# Patient Record
Sex: MALE | Race: WHITE | Employment: UNEMPLOYED | URBAN - METROPOLITAN AREA
[De-identification: names, ages, dates, MRNs, and addresses within clinical notes are randomized per-mention and may not be internally consistent; named-entity substitution may affect disease eponyms.]

---

## 2022-11-24 ENCOUNTER — APPOINTMENT (OUTPATIENT)
Dept: GENERAL RADIOLOGY | Age: 57
DRG: 918 | End: 2022-11-24
Payer: MEDICAID

## 2022-11-24 ENCOUNTER — HOSPITAL ENCOUNTER (INPATIENT)
Age: 57
LOS: 1 days | Discharge: HOME | DRG: 918 | End: 2022-11-25
Attending: EMERGENCY MEDICINE | Admitting: INTERNAL MEDICINE
Payer: MEDICAID

## 2022-11-24 ENCOUNTER — APPOINTMENT (OUTPATIENT)
Dept: CT IMAGING | Age: 57
DRG: 918 | End: 2022-11-24
Payer: MEDICAID

## 2022-11-24 DIAGNOSIS — R51.9 ACUTE NONINTRACTABLE HEADACHE, UNSPECIFIED HEADACHE TYPE: ICD-10-CM

## 2022-11-24 DIAGNOSIS — R09.02 HYPOXIA: ICD-10-CM

## 2022-11-24 DIAGNOSIS — T68.XXXA HYPOTHERMIA, INITIAL ENCOUNTER: ICD-10-CM

## 2022-11-24 DIAGNOSIS — T50.901A ACCIDENTAL DRUG OVERDOSE, INITIAL ENCOUNTER: Primary | ICD-10-CM

## 2022-11-24 DIAGNOSIS — R61 DIAPHORESIS: ICD-10-CM

## 2022-11-24 LAB
A/G RATIO: 1.3 (ref 1.1–2.2)
ACETAMINOPHEN LEVEL: <5 UG/ML (ref 10–30)
ALBUMIN SERPL-MCNC: 4.4 G/DL (ref 3.4–5)
ALP BLD-CCNC: 59 U/L (ref 40–129)
ALT SERPL-CCNC: 32 U/L (ref 10–40)
ANION GAP SERPL CALCULATED.3IONS-SCNC: 15 MMOL/L (ref 3–16)
AST SERPL-CCNC: 32 U/L (ref 15–37)
BASOPHILS ABSOLUTE: 0.2 K/UL (ref 0–0.2)
BASOPHILS RELATIVE PERCENT: 3.1 %
BILIRUB SERPL-MCNC: <0.2 MG/DL (ref 0–1)
BUN BLDV-MCNC: 20 MG/DL (ref 7–20)
CALCIUM SERPL-MCNC: 9.4 MG/DL (ref 8.3–10.6)
CHLORIDE BLD-SCNC: 103 MMOL/L (ref 99–110)
CO2: 23 MMOL/L (ref 21–32)
CREAT SERPL-MCNC: 1 MG/DL (ref 0.9–1.3)
EKG ATRIAL RATE: 80 BPM
EKG DIAGNOSIS: NORMAL
EKG P AXIS: 53 DEGREES
EKG P-R INTERVAL: 188 MS
EKG Q-T INTERVAL: 406 MS
EKG QRS DURATION: 84 MS
EKG QTC CALCULATION (BAZETT): 468 MS
EKG R AXIS: -11 DEGREES
EKG T AXIS: 60 DEGREES
EKG VENTRICULAR RATE: 80 BPM
EOSINOPHILS ABSOLUTE: 0.1 K/UL (ref 0–0.6)
EOSINOPHILS RELATIVE PERCENT: 0.8 %
ETHANOL: NORMAL MG/DL (ref 0–0.08)
GFR SERPL CREATININE-BSD FRML MDRD: >60 ML/MIN/{1.73_M2}
GLUCOSE BLD-MCNC: 150 MG/DL (ref 70–99)
HCT VFR BLD CALC: 44.6 % (ref 40.5–52.5)
HEMOGLOBIN: 15.2 G/DL (ref 13.5–17.5)
LYMPHOCYTES ABSOLUTE: 1.8 K/UL (ref 1–5.1)
LYMPHOCYTES RELATIVE PERCENT: 28 %
MAGNESIUM: 2.4 MG/DL (ref 1.8–2.4)
MCH RBC QN AUTO: 29.3 PG (ref 26–34)
MCHC RBC AUTO-ENTMCNC: 34 G/DL (ref 31–36)
MCV RBC AUTO: 86 FL (ref 80–100)
MONOCYTES ABSOLUTE: 0.5 K/UL (ref 0–1.3)
MONOCYTES RELATIVE PERCENT: 7 %
NEUTROPHILS ABSOLUTE: 4 K/UL (ref 1.7–7.7)
NEUTROPHILS RELATIVE PERCENT: 61.1 %
PDW BLD-RTO: 13.8 % (ref 12.4–15.4)
PLATELET # BLD: 392 K/UL (ref 135–450)
PMV BLD AUTO: 8.8 FL (ref 5–10.5)
POTASSIUM SERPL-SCNC: 3.9 MMOL/L (ref 3.5–5.1)
RBC # BLD: 5.19 M/UL (ref 4.2–5.9)
SALICYLATE, SERUM: <0.3 MG/DL (ref 15–30)
SODIUM BLD-SCNC: 141 MMOL/L (ref 136–145)
TOTAL PROTEIN: 7.8 G/DL (ref 6.4–8.2)
TROPONIN: <0.01 NG/ML
TROPONIN: <0.01 NG/ML
WBC # BLD: 6.5 K/UL (ref 4–11)

## 2022-11-24 PROCEDURE — 82077 ASSAY SPEC XCP UR&BREATH IA: CPT

## 2022-11-24 PROCEDURE — 80053 COMPREHEN METABOLIC PANEL: CPT

## 2022-11-24 PROCEDURE — 84484 ASSAY OF TROPONIN QUANT: CPT

## 2022-11-24 PROCEDURE — 70450 CT HEAD/BRAIN W/O DYE: CPT

## 2022-11-24 PROCEDURE — 85025 COMPLETE CBC W/AUTO DIFF WBC: CPT

## 2022-11-24 PROCEDURE — 6360000002 HC RX W HCPCS: Performed by: EMERGENCY MEDICINE

## 2022-11-24 PROCEDURE — 2580000003 HC RX 258: Performed by: EMERGENCY MEDICINE

## 2022-11-24 PROCEDURE — 70498 CT ANGIOGRAPHY NECK: CPT

## 2022-11-24 PROCEDURE — 96374 THER/PROPH/DIAG INJ IV PUSH: CPT

## 2022-11-24 PROCEDURE — 80179 DRUG ASSAY SALICYLATE: CPT

## 2022-11-24 PROCEDURE — 99285 EMERGENCY DEPT VISIT HI MDM: CPT

## 2022-11-24 PROCEDURE — 96375 TX/PRO/DX INJ NEW DRUG ADDON: CPT

## 2022-11-24 PROCEDURE — 2060000000 HC ICU INTERMEDIATE R&B

## 2022-11-24 PROCEDURE — 93005 ELECTROCARDIOGRAM TRACING: CPT | Performed by: EMERGENCY MEDICINE

## 2022-11-24 PROCEDURE — 6360000004 HC RX CONTRAST MEDICATION: Performed by: EMERGENCY MEDICINE

## 2022-11-24 PROCEDURE — 83735 ASSAY OF MAGNESIUM: CPT

## 2022-11-24 PROCEDURE — 71045 X-RAY EXAM CHEST 1 VIEW: CPT

## 2022-11-24 PROCEDURE — 4A03X5D MEASUREMENT OF ARTERIAL FLOW, INTRACRANIAL, EXTERNAL APPROACH: ICD-10-PCS | Performed by: RADIOLOGY

## 2022-11-24 PROCEDURE — 80143 DRUG ASSAY ACETAMINOPHEN: CPT

## 2022-11-24 RX ORDER — DIPHENHYDRAMINE HYDROCHLORIDE 50 MG/ML
12.5 INJECTION INTRAMUSCULAR; INTRAVENOUS ONCE
Status: COMPLETED | OUTPATIENT
Start: 2022-11-24 | End: 2022-11-24

## 2022-11-24 RX ORDER — 0.9 % SODIUM CHLORIDE 0.9 %
500 INTRAVENOUS SOLUTION INTRAVENOUS ONCE
Status: COMPLETED | OUTPATIENT
Start: 2022-11-24 | End: 2022-11-24

## 2022-11-24 RX ORDER — METOCLOPRAMIDE HYDROCHLORIDE 5 MG/ML
10 INJECTION INTRAMUSCULAR; INTRAVENOUS ONCE
Status: COMPLETED | OUTPATIENT
Start: 2022-11-24 | End: 2022-11-24

## 2022-11-24 RX ORDER — NALOXONE HYDROCHLORIDE 1 MG/ML
1 INJECTION INTRAMUSCULAR; INTRAVENOUS; SUBCUTANEOUS ONCE
Status: COMPLETED | OUTPATIENT
Start: 2022-11-24 | End: 2022-11-24

## 2022-11-24 RX ORDER — ACETAMINOPHEN 325 MG/1
650 TABLET ORAL EVERY 6 HOURS PRN
Status: DISCONTINUED | OUTPATIENT
Start: 2022-11-24 | End: 2022-11-25 | Stop reason: HOSPADM

## 2022-11-24 RX ORDER — SODIUM CHLORIDE 0.9 % (FLUSH) 0.9 %
5-40 SYRINGE (ML) INJECTION PRN
Status: DISCONTINUED | OUTPATIENT
Start: 2022-11-24 | End: 2022-11-25 | Stop reason: HOSPADM

## 2022-11-24 RX ORDER — POLYETHYLENE GLYCOL 3350 17 G/17G
17 POWDER, FOR SOLUTION ORAL DAILY PRN
Status: DISCONTINUED | OUTPATIENT
Start: 2022-11-24 | End: 2022-11-25 | Stop reason: HOSPADM

## 2022-11-24 RX ORDER — ACETAMINOPHEN 650 MG/1
650 SUPPOSITORY RECTAL EVERY 6 HOURS PRN
Status: DISCONTINUED | OUTPATIENT
Start: 2022-11-24 | End: 2022-11-25 | Stop reason: HOSPADM

## 2022-11-24 RX ORDER — ONDANSETRON 4 MG/1
4 TABLET, ORALLY DISINTEGRATING ORAL EVERY 8 HOURS PRN
Status: DISCONTINUED | OUTPATIENT
Start: 2022-11-24 | End: 2022-11-25 | Stop reason: HOSPADM

## 2022-11-24 RX ORDER — ONDANSETRON 2 MG/ML
4 INJECTION INTRAMUSCULAR; INTRAVENOUS EVERY 6 HOURS PRN
Status: DISCONTINUED | OUTPATIENT
Start: 2022-11-24 | End: 2022-11-25 | Stop reason: HOSPADM

## 2022-11-24 RX ORDER — SODIUM CHLORIDE 0.9 % (FLUSH) 0.9 %
5-40 SYRINGE (ML) INJECTION EVERY 12 HOURS SCHEDULED
Status: DISCONTINUED | OUTPATIENT
Start: 2022-11-25 | End: 2022-11-25 | Stop reason: HOSPADM

## 2022-11-24 RX ORDER — SODIUM CHLORIDE 9 MG/ML
INJECTION, SOLUTION INTRAVENOUS PRN
Status: DISCONTINUED | OUTPATIENT
Start: 2022-11-24 | End: 2022-11-25 | Stop reason: HOSPADM

## 2022-11-24 RX ADMIN — NALOXONE HYDROCHLORIDE 1 MG: 1 INJECTION PARENTERAL at 17:55

## 2022-11-24 RX ADMIN — DIPHENHYDRAMINE HYDROCHLORIDE 12.5 MG: 50 INJECTION, SOLUTION INTRAMUSCULAR; INTRAVENOUS at 17:33

## 2022-11-24 RX ADMIN — SODIUM CHLORIDE 500 ML: 9 INJECTION, SOLUTION INTRAVENOUS at 17:34

## 2022-11-24 RX ADMIN — IOHEXOL 100 ML: 350 INJECTION, SOLUTION INTRAVENOUS at 19:08

## 2022-11-24 RX ADMIN — METOCLOPRAMIDE HYDROCHLORIDE 10 MG: 5 INJECTION INTRAMUSCULAR; INTRAVENOUS at 17:33

## 2022-11-24 ASSESSMENT — PAIN - FUNCTIONAL ASSESSMENT: PAIN_FUNCTIONAL_ASSESSMENT: NONE - DENIES PAIN

## 2022-11-24 ASSESSMENT — ENCOUNTER SYMPTOMS
VOMITING: 0
SHORTNESS OF BREATH: 0
ABDOMINAL PAIN: 0
NAUSEA: 0
BACK PAIN: 0
EYE PAIN: 0

## 2022-11-24 NOTE — ED PROVIDER NOTES
[address]        Pt Name: Anni Joienr  MRN: [id number]  [place] 1965  Date of evaluation: 11/24/2022  Provider: Todd Ryan MD  PCP: No primary care provider on file. CHIEF COMPLAINT       Chief Complaint   Patient presents with    Drug Overdose     Pt states he smoked some crack and then felt funny. Per police his wife called [phone]. Pt was agonal breathing at scene and was given \"2 doses of 4 mg\" Narcan on scene. Pt extremely diaphoretic upon arrival. No pain. Alert and oriented. HISTORY OFPRESENT ILLNESS   (Location/Symptom, Timing/Onset, Context/Setting, Quality, Duration, Modifying Factors,Severity)  Note limiting factors. Anni Joiner is a 56 y.o. male presenting today due to concern for reportedly feeling fine earlier today but ultimately taking what he thought was cocaine and shortly after this stated to his girlfriend that he felt very funny and following this started to pass out. The patient was concerned that maybe his cocaine was mixed with fentanyl although he did not try to purchase this. He denies feeling suicidal or trying to hurt himself. By the time EMS and police arrived, he was having agonal respirations and reportedly received 2 doses of Narcan with police being unsure if they were 2 mg doses or 4 mg doses (either 4 mg or 8 mg total but unsure which one). No reported falls or trauma per EMS or police on the scene and he reportedly was sitting on the chair whenever he passed out following the drug use. He does complain of a significant headache at this time and one of the worst of his life since he does not normally get headaches. He denies any neck pain. No chest pain or shortness of breath. He reports feeling \"numb all over. \"  No abdominal pain.   Other than having a headache, he has no other complaints at this time and just reports feeling a general sense of numbness throughout his entire body.          REVIEW OF SYSTEMS    (2-9 systems for level 4, 10 or more for level 5)     Review of Systems   Constitutional:  Positive for diaphoresis (on arrival) and fatigue. Negative for chills and fever. Eyes:  Negative for pain. Respiratory:  Negative for shortness of breath. Cardiovascular:  Negative for chest pain. Gastrointestinal:  Negative for abdominal pain, nausea and vomiting (no reported vomiting on the scene). Genitourinary:  Negative for flank pain. Musculoskeletal:  Positive for gait problem (due to generalized weakness since drug use). Negative for arthralgias, back pain and neck pain. Skin:  Negative for wound. Neurological:  Positive for syncope (reported LOC shortly after drug use), weakness (generalized), numbness (generalized over entire body, nonfocal) and headaches (severe). Psychiatric/Behavioral:  Negative for suicidal ideas. The patient is nervous/anxious. Positives and Pertinent negatives as per HPI. PASTMEDICAL HISTORY   History reviewed. No pertinent past medical history. SURGICAL HISTORY       Past Surgical History:   Procedure Laterality Date    APPENDECTOMY           CURRENT MEDICATIONS       Previous Medications    No medications on file       ALLERGIES     Patient has no known allergies. FAMILY HISTORY     History reviewed. No pertinent family history.        SOCIAL HISTORY       Social History     Socioeconomic History    Marital status: Single     Spouse name: None    Number of children: None    Years of education: None    Highest education level: None   Tobacco Use    Smoking status: Every Day     Packs/day: 1.00     Types: Cigarettes    Smokeless tobacco: Never   Substance and Sexual Activity    Alcohol use: Yes     Comment: rare    Drug use: Yes     Types: Cocaine       SCREENINGS    Silva Coma Scale  Eye Opening: Spontaneous  Best Verbal Response: Oriented  Best Motor Response: Obeys commands  Silva Coma Scale Score: 15 PHYSICAL EXAM    (up to 7 for level 4, 8 or more for level 5)     ED Triage Vitals   BP Temp Temp src Pulse Resp SpO2 Height Weight   -- -- -- -- -- -- -- --       Physical Exam  Vitals and nursing note reviewed. Constitutional:       General: He is in acute distress (mild). Appearance: Normal appearance. He is well-developed. He is ill-appearing and diaphoretic. He is not toxic-appearing. Interventions: He is not intubated. HENT:      Head: Normocephalic and atraumatic. Right Ear: External ear normal.      Left Ear: External ear normal.      Nose: Nose normal.      Mouth/Throat:      Mouth: Mucous membranes are moist.   Eyes:      General: Lids are normal. No scleral icterus. Right eye: No discharge. Left eye: No discharge. Conjunctiva/sclera: Conjunctivae normal.      Right eye: Right conjunctiva is not injected. No chemosis, exudate or hemorrhage. Left eye: Left conjunctiva is not injected. No chemosis, exudate or hemorrhage. Pupils: Pupils are equal, round, and reactive to light. Pupils are equal.      Right eye: Pupil is round, reactive and not sluggish. Left eye: Pupil is round, reactive and not sluggish. Slit lamp exam:     Right eye: No photophobia. Left eye: No photophobia. Neck:      Trachea: Trachea and phonation normal. No tracheal deviation. Cardiovascular:      Rate and Rhythm: Normal rate and regular rhythm. Pulses: Normal pulses. Radial pulses are 2+ on the right side and 2+ on the left side. Pulmonary:      Effort: Pulmonary effort is normal. No tachypnea, bradypnea, accessory muscle usage, prolonged expiration, respiratory distress or retractions. He is not intubated. Breath sounds: Normal breath sounds and air entry. No stridor. No decreased breath sounds, wheezing, rhonchi or rales. Chest:      Chest wall: No tenderness. Abdominal:      General: Abdomen is flat.  Bowel sounds are normal. There is no distension. Palpations: Abdomen is soft. Tenderness: There is no abdominal tenderness. There is no guarding or rebound. Musculoskeletal:         General: No tenderness, deformity or signs of injury. Normal range of motion. Cervical back: Full passive range of motion without pain, normal range of motion and neck supple. No edema, erythema, signs of trauma, rigidity, torticollis, tenderness or crepitus. No pain with movement, spinous process tenderness or muscular tenderness. Normal range of motion. Right lower leg: No edema. Left lower leg: No edema. Skin:     General: Skin is warm. Coloration: Skin is not jaundiced or pale. Findings: No erythema or rash. Neurological:      General: No focal deficit present. Mental Status: He is alert and oriented to person, place, and time. Mental status is at baseline. GCS: GCS eye subscore is 4. GCS verbal subscore is 5. GCS motor subscore is 6. Cranial Nerves: No dysarthria or facial asymmetry. Sensory: Sensation is intact. No sensory deficit. Motor: Motor function is intact. No weakness, tremor, atrophy, abnormal muscle tone or seizure activity. Psychiatric:         Attention and Perception: Attention normal.         Mood and Affect: Affect normal. Mood is depressed. Mood is not anxious. Speech: He is communicative. Speech is not delayed or slurred. Behavior: Behavior normal. Behavior is cooperative. Thought Content: Thought content normal. Thought content does not include suicidal ideation. Thought content does not include suicidal plan.            DIAGNOSTIC RESULTS   :    Labs Reviewed   COMPREHENSIVE METABOLIC PANEL - Abnormal; Notable for the following components:       Result Value    Glucose 150 (*)     All other components within normal limits   ACETAMINOPHEN LEVEL - Abnormal; Notable for the following components:    Acetaminophen Level <5 (*)     All other components within normal limits   SALICYLATE LEVEL - Abnormal; Notable for the following components:    Salicylate, Serum <2.1 (*)     All other components within normal limits   CBC WITH AUTO DIFFERENTIAL   TROPONIN   ETHANOL   MAGNESIUM   URINE DRUG SCREEN   TROPONIN       All other labs were within normal range or not returned asof this dictation. EKG: All EKG's are interpreted by the Emergency Department Physician who either signs or Co-signs this chart in the absence of a cardiologist.    The Ekg interpreted by me shows  normal sinus rhythm with a rate of 95  Axis is   Normal  QTc is   borderline prolonged QT  Intervals and Durations are unremarkable. ST Segments: nonspecific changes  No significant change from prior EKG dated - no old EKG  No STEMI, RSR' present today but no signs of Brugada syndrome at this time   Repeat EKG with no acute changes and no STEMI       RADIOLOGY:   Non-plain film images such as CT, Ultrasound and MRI are read by the radiologist. Cayetano Bulla images are visualized and preliminarily interpreted by the  ED Provider with the belowfindings:        Interpretation per the Radiologist below, if available at the time of this note:    XR CHEST PORTABLE   Final Result      Mild bibasilar atelectasis. CT HEAD WO CONTRAST   Final Result      1. No acute intracranial process. PROCEDURES   Unless otherwise noted below, none     Procedures    CRITICAL CARE TIME   Critical Care Time:    I personally saw the patient and independently provided 30 minutes of non-concurrent critical care out of the total shared critical care time provided. Includes repeat examinations, lab interpretation, charting, treating for acute overdose needing additional Narcan   Excludes separate billable procedures. Patient at risk for serious decompensation if not treated for this life-threatening condition.             CONSULTS:  None    EMERGENCY DEPARTMENT COURSE and DIFFERENTIAL DIAGNOSIS/MDM:   Vitals: Vitals:    11/24/22 1730 11/24/22 1731 11/24/22 1740 11/24/22 1758   BP:       Pulse:  82 82    Resp:  20 19    Temp: (!) 94.9 °F (34.9 °C)   (!) 96.7 °F (35.9 °C)   TempSrc: Temporal   Temporal   SpO2:  (!) 87% 96%        Patient was given the following medications:  Medications   metoclopramide (REGLAN) injection 10 mg (10 mg IntraVENous Given 11/24/22 1733)   diphenhydrAMINE (BENADRYL) injection 12.5 mg (12.5 mg IntraVENous Given 11/24/22 1733)   0.9 % sodium chloride bolus (500 mLs IntraVENous New Bag 11/24/22 1734)   naloxone (NARCAN) injection 1 mg (1 mg IntraVENous Given 11/24/22 1755)     Patient was evaluated due to ingesting what he thought was cocaine just prior to arrival but ultimately being concerned that maybe it was laced with fentanyl. He did require Narcan on scene and did have significant improvement of his initial agonal respirations per EMS prior to arrival to the ED. He was hypothermic on arrival and therefore [employer] was placed. He also did briefly become hypoxic as well and therefore I did give additional Narcan. I do believe his hypoxia and hypothermia related to recent drug use associated with diaphoresis. He denies any chest pain or shortness of breath but we will obtain a repeat troponin and EKG to ensure no changes since he did report using cocaine earlier today as well. He does complain of significant headache and he had no reported neck pain or photophobia and at this time I feel it is more likely related to his drug overdose and less likely related to meningitis or subarachnoid hemorrhage but we will obtain a CT of the head to be safe. He was ultimately signed out to Dr. Rigo Ramirez at [id number] to follow-up on repeat troponin and evaluation and if he improves and is back to normal baseline with normal vitals, he could potentially go home but if symptoms persist, he may need to be admitted to the hospital for further evaluation. Please see Dr. Jolene Mena note for final disposition. Repeat evaluation at 1850 was obtained and he did report his headache was down to a 6 out of 10. He had full mental capacity to make his own medical decisions and at this time we did agree to do a CTA of the head and neck but at this time he was to not wanting lumbar puncture and wanted to see how he did further in the ED. This was signed out to Dr. Golden Gorman. Patient is aware if it was related to subarachnoid hemorrhage or meningitis, it could cause severe disability or death but at this time is declining lumbar puncture. The patient tolerated their visit well. The patient and / or the family were informed of the results of any tests, a time was given to answer questions. FINAL IMPRESSION      1. Accidental drug overdose, initial encounter    2. Diaphoresis    3. Acute nonintractable headache, unspecified headache type    4. Hypothermia, initial encounter    5. Hypoxia          DISPOSITION/PLAN   DISPOSITION  -Pending repeat evaluation and repeat troponin along with CTA       PATIENT REFERRED TO:  No follow-up provider specified.     DISCHARGEMEDICATIONS:  New Prescriptions    No medications on file       DISCONTINUED MEDICATIONS:  Discontinued Medications    No medications on file              (Please note that portions of this note were completed with a voicerecognition program.  Efforts were made to edit the dictations but occasionally words are mis-transcribed.)    Lynda Tucker MD (electronically signed)            Lynda Tucker MD  11/24/22 Josselyn Zamora MD  11/24/22 Josselyn Zamora MD  11/24/22 3893

## 2022-11-25 VITALS
HEIGHT: 73 IN | DIASTOLIC BLOOD PRESSURE: 77 MMHG | TEMPERATURE: 97.9 F | RESPIRATION RATE: 16 BRPM | HEART RATE: 66 BPM | SYSTOLIC BLOOD PRESSURE: 131 MMHG | WEIGHT: 187.25 LBS | OXYGEN SATURATION: 96 % | BODY MASS INDEX: 24.82 KG/M2

## 2022-11-25 LAB
AMPHETAMINE SCREEN, URINE: ABNORMAL
ANION GAP SERPL CALCULATED.3IONS-SCNC: 8 MMOL/L (ref 3–16)
BARBITURATE SCREEN URINE: ABNORMAL
BASOPHILS ABSOLUTE: 0 K/UL (ref 0–0.2)
BASOPHILS RELATIVE PERCENT: 0.3 %
BENZODIAZEPINE SCREEN, URINE: ABNORMAL
BUN BLDV-MCNC: 18 MG/DL (ref 7–20)
CALCIUM SERPL-MCNC: 8.8 MG/DL (ref 8.3–10.6)
CANNABINOID SCREEN URINE: ABNORMAL
CHLORIDE BLD-SCNC: 104 MMOL/L (ref 99–110)
CO2: 28 MMOL/L (ref 21–32)
COCAINE METABOLITE SCREEN URINE: POSITIVE
CREAT SERPL-MCNC: 1 MG/DL (ref 0.9–1.3)
EOSINOPHILS ABSOLUTE: 0.1 K/UL (ref 0–0.6)
EOSINOPHILS RELATIVE PERCENT: 0.6 %
FENTANYL SCREEN, URINE: POSITIVE
GFR SERPL CREATININE-BSD FRML MDRD: >60 ML/MIN/{1.73_M2}
GLUCOSE BLD-MCNC: 85 MG/DL (ref 70–99)
HCT VFR BLD CALC: 41.6 % (ref 40.5–52.5)
HEMOGLOBIN: 14 G/DL (ref 13.5–17.5)
LYMPHOCYTES ABSOLUTE: 2.8 K/UL (ref 1–5.1)
LYMPHOCYTES RELATIVE PERCENT: 29.7 %
Lab: ABNORMAL
MCH RBC QN AUTO: 29.6 PG (ref 26–34)
MCHC RBC AUTO-ENTMCNC: 33.5 G/DL (ref 31–36)
MCV RBC AUTO: 88.4 FL (ref 80–100)
METHADONE SCREEN, URINE: ABNORMAL
MONOCYTES ABSOLUTE: 0.7 K/UL (ref 0–1.3)
MONOCYTES RELATIVE PERCENT: 7.5 %
NEUTROPHILS ABSOLUTE: 5.9 K/UL (ref 1.7–7.7)
NEUTROPHILS RELATIVE PERCENT: 61.9 %
OPIATE SCREEN URINE: ABNORMAL
OXYCODONE URINE: ABNORMAL
PDW BLD-RTO: 13.7 % (ref 12.4–15.4)
PH UA: 6
PHENCYCLIDINE SCREEN URINE: ABNORMAL
PLATELET # BLD: 322 K/UL (ref 135–450)
PMV BLD AUTO: 8.6 FL (ref 5–10.5)
POTASSIUM REFLEX MAGNESIUM: 4.4 MMOL/L (ref 3.5–5.1)
RBC # BLD: 4.71 M/UL (ref 4.2–5.9)
SODIUM BLD-SCNC: 140 MMOL/L (ref 136–145)
WBC # BLD: 9.6 K/UL (ref 4–11)

## 2022-11-25 PROCEDURE — 97165 OT EVAL LOW COMPLEX 30 MIN: CPT

## 2022-11-25 PROCEDURE — 2580000003 HC RX 258

## 2022-11-25 PROCEDURE — 97161 PT EVAL LOW COMPLEX 20 MIN: CPT

## 2022-11-25 PROCEDURE — 6370000000 HC RX 637 (ALT 250 FOR IP)

## 2022-11-25 PROCEDURE — 80048 BASIC METABOLIC PNL TOTAL CA: CPT

## 2022-11-25 PROCEDURE — 97535 SELF CARE MNGMENT TRAINING: CPT

## 2022-11-25 PROCEDURE — 94760 N-INVAS EAR/PLS OXIMETRY 1: CPT

## 2022-11-25 PROCEDURE — 80307 DRUG TEST PRSMV CHEM ANLYZR: CPT

## 2022-11-25 PROCEDURE — 36415 COLL VENOUS BLD VENIPUNCTURE: CPT

## 2022-11-25 PROCEDURE — 97116 GAIT TRAINING THERAPY: CPT

## 2022-11-25 PROCEDURE — 85025 COMPLETE CBC W/AUTO DIFF WBC: CPT

## 2022-11-25 RX ORDER — TAMSULOSIN HYDROCHLORIDE 0.4 MG/1
0.4 CAPSULE ORAL DAILY
Qty: 30 CAPSULE | Refills: 3 | Status: SHIPPED | OUTPATIENT
Start: 2022-11-26

## 2022-11-25 RX ORDER — TAMSULOSIN HYDROCHLORIDE 0.4 MG/1
0.4 CAPSULE ORAL DAILY
Status: DISCONTINUED | OUTPATIENT
Start: 2022-11-25 | End: 2022-11-25 | Stop reason: HOSPADM

## 2022-11-25 RX ADMIN — SODIUM CHLORIDE, PRESERVATIVE FREE 10 ML: 5 INJECTION INTRAVENOUS at 09:35

## 2022-11-25 RX ADMIN — TAMSULOSIN HYDROCHLORIDE 0.4 MG: 0.4 CAPSULE ORAL at 09:35

## 2022-11-25 ASSESSMENT — ENCOUNTER SYMPTOMS
NAUSEA: 0
ABDOMINAL PAIN: 0
DIARRHEA: 0
SHORTNESS OF BREATH: 1
COUGH: 0
BACK PAIN: 0
FACIAL SWELLING: 0
ABDOMINAL DISTENTION: 0
VOMITING: 0

## 2022-11-25 NOTE — PROGRESS NOTES
Occupational Therapy  Facility/Department: [facility]  Occupational Therapy Initial Assessment    Name: Joel Mccauley  : 1965  MRN: [id number]  Date of Service: 2022    Discharge Recommendations: Joel Mccauley scored a 24/24 on the AM-PAC ADL Inpatient form. Current research shows that an AM-PAC score of 18 or greater is typically associated with a discharge to the patient's home setting. At this time, this patient demonstrates the endurance and safety to discharge home with supervision. Please see assessment section for further patient specific details. If patient discharges prior to next session this note will serve as a discharge summary. Please see below for the latest assessment towards goals. OT Equipment Recommendations  Equipment Needed: No       Patient Diagnosis(es): The primary encounter diagnosis was Accidental drug overdose, initial encounter. Diagnoses of Diaphoresis, Acute nonintractable headache, unspecified headache type, Hypothermia, initial encounter, and Hypoxia were also pertinent to this visit. Past Medical History:  has no past medical history on file. Past Surgical History:  has a past surgical history that includes Appendectomy. Assessment   Assessment: Pt is from home with girlfriend and independent at baseline. Pt presented to [place] [id number] after an accidental cocaine overdose and lingering dizziness. Pt is performing near functional baseline and performed bed mobility, transfers, and fx mobility with supervision-independence. Pt also performed ADLs with modified independence-independence. Pt has no concerns about returning home. No further acute OT needs at this time and will sign off. Recommend initial supervision from girlfriend at home. Pt anticipating d/c today.   Prognosis: Good  Decision Making: Low Complexity  REQUIRES OT FOLLOW-UP: No  Activity Tolerance  Activity Tolerance: Patient Tolerated treatment well        Plan   Occupational Therapy Plan  Times Per Week: d/c acute OT     Restrictions  Position Activity Restriction  Other position/activity restrictions: up with assist    Subjective   General  Chart Reviewed: Yes  Patient assessed for rehabilitation services?: Yes  Additional Pertinent Hx: 61-year-old male with past medical history significant for substanceuse, HTN, osteoarthritis who presented to the hospital with complaint of drug overdose after using cocaine yesterday . Head CT= No acute intracranial process. Chest XR=Mild bibasilar atelectasis. Head/neck CTA=No aneurysms, vascular occlusions, or intracranial stenoses identified. No significant stenosis in the extracranial vertebral or carotid arteries. Referring Practitioner: Nora Peters MD  Diagnosis: Drug overdose, accidental or unintentional  Subjective  Subjective: Pt lying supine in bed upon OT arrival and agreeable to OT evaluatoin. Pt stated, \"I  yesterday and they brought me back\". Pt pleasant throughout session. Social/Functional History  Social/Functional History  Lives With: Significant other  Type of Home: Apartment  Home Layout: One level  Home Access: Stairs to enter with rails  Entrance Stairs - Number of Steps: 3 LARISSA  Entrance Stairs - Rails: Both  Bathroom Shower/Tub: Tub/Shower unit  Bathroom Toilet: Standard  Home Equipment:  (owns no DME)  ADL Assistance: Independent  Homemaking Assistance: Independent  Ambulation Assistance: Independent  Transfer Assistance: Independent  Active : No  Patient's  Info: girlfriend drives  Occupation: Unemployed  Additional Comments: Pt reports no recent falls. Objective   Heart Rate: 66  Heart Rate Source: Monitor  BP: 131/77  BP Location: Left lower arm  BP Method: Automatic  Patient Position: Semi fowlers  MAP (Calculated): 95  Resp: 16  SpO2: 96 %  O2 Device: None (Room air)             Safety Devices  Type of Devices: Chair alarm in place;Call light within reach; Left in chair;Gait belt;Nurse notified  Balance  Sitting: Intact  Standing: Intact  Gait  Overall Level of Assistance: Supervision (pt ambulated without AD to/from bathroom and around loop in hallway; SBA provided initially and pt progressed to [facility])  Toilet Transfers  Toilet - Technique: Ambulating  Equipment Used: Standard toilet  Toilet Transfer: Supervision  AROM: Within functional limits (painful arthritic thumb joint)  Strength: Within functional limits (5/5 BUE)  Coordination: Within functional limits  Tone: Normal  Sensation: Intact  ADL  Feeding: Independent  Grooming: Supervision  Grooming Skilled Clinical Factors: to brush teeth standing at sink  UE Dressing: Modified independent   UE Dressing Skilled Clinical Factors: to don gown from sitting position  LE Dressing: Modified independent   LE Dressing Skilled Clinical Factors: from seated on edge of bed, performed simulated task        Bed mobility  Supine to Sit: Independent  Scooting: Independent  Transfers  Sit to stand: Independent  Stand to sit:  Independent  Vision  Vision: Within Functional Limits  Hearing  Hearing: Within functional limits  Cognition  Overall Cognitive Status: WFL  Orientation  Overall Orientation Status: Within Normal Limits  Orientation Level: Oriented X4                  Education Given To: Patient  Education Provided: Role of Therapy;Plan of Care  Education Provided Comments: educated on f/u with OP hand clinic if thumb on R hand continues to give him trouble; pt reported he has arthritis and it has made it difficult for him to hold onto objects  Education Method: Verbal  Barriers to Learning: None  Education Outcome: Verbalized understanding                        G-Code     OutComes Score                                                  AM-PAC Score        AM-[facility] Inpatient Daily Activity Raw Score: 24 (11/25/22 1233)  AM-PAC Inpatient ADL T-Scale Score : 57.54 (11/25/22 1233)  ADL Inpatient CMS 0-100% Score: 0 (11/25/22 1233)  ADL Inpatient CMS G-Code Modifier : CH (11/25/22 1233)    Tinneti Score       Goals          Therapy Time   Individual Concurrent Group Co-treatment   Time In 1020         Time Out 1044         Minutes 24         Timed Code Treatment Minutes: 9 Minutes (+ 15 min OT eval)       Baldo Monk, OT

## 2022-11-25 NOTE — ED NOTES
Report called to Ariana CASTRO at [facility] and informed of squad here.       Rosalina Ernandez RN  11/24/22 9943

## 2022-11-25 NOTE — PROGRESS NOTES
Pt. Oriented x4. VSS on RA. No neuro changes overnight. Pt. States his headache has gone away. Pt. Voiding well via urinal. Pt. Unsteady on feet when ambulating. All fall precautions in place and call light is within reach.

## 2022-11-25 NOTE — DISCHARGE INSTRUCTIONS
Make appointment with resident clinic to establish care        Alcohol and Substance Abuse Community Resources:    Information and Referrals:  [address] [facility]) 24 hours/ 7days - [address] [facility] [address] [facility] [address] (24/7 hotline)- [phone](OT); [phone] [facility])  Kayy Mario;  705 [facility], [address] [facility]  [facility], 2900 [facility] (Treatment consultant) - [phone]   ([email or web address] email: Maggie Huitron, [address] [address] MEDCENTER Coordinator) - [phone]    Self Help Resources:  Alcoholics Anonymous Hotline (www.AAcSonicPollen.[employer])  (24 hours/ 7days) - [phone]    213 [facility] Room 202(enter on [facility], [address]  5510 [facility] (www.[facility] Center[facility]Track. org)- for [facility] - [phone]  Al-ANON [facility]/ [facility] Group - [phone]  (www.kyal-[employer]. org)   Narcotics Anonymous (www.Jeeran)  - [phone]  Smart Recovery (www.smartrecovery. org) - [phone]    Suicide Hotlines: toll free and available 24/7  515-674-CARE (40) 8416 2178)  6-965-RASCPWZ (2-912.180.8160)  0-822-455-TALK (1-718-273-SHSR) - [facility]  TTY: 2-650-249-4PWV    Detoxification Services/ Inpatient Treatment/ Residential Treatment Programs:  [place] - [phone]    1460 [facility], [address] [place] [address] for [address] ([address]) - [phone] [address]  [facility] Board - [phone]  [address] [place] [address], 20201 [facility]  Rona [address] [place] [address]) - [phone] ext.  [address], [address] or [phone]  400 [facility] [address] [place], 230 [facility] (www.Sportmaniacs.Ciafo) - [phone]  [facility] [address] - [phone]   301 [facility] [address]  [address] [place] [address] [phone]  Estrela [address] [place], [address] Yonis Sanchez. (residential, outpatient) - [phone]  [facility], 600 [facility] - [phone]  91 [facility]ENTER AT [address] - [phone] (must be [facility] BEHAVIORAL HEALTH resident)   [address] [place] [address] [phone]     Crisis or Emergency Needs - [address] (0953) available    [address] [place] [address] [phone] ([facility]. org)     Inpatient/ Residential Treatment Just for Men:  [facility] Treatment - [phone]  [address] [place] [address], [address] [place] [address]  [id number] [place] [address] (www.[facility]. [facility]) - [phone]  34 [facility] (Men)     Inpatient/ Residential Treatment Just for Women:  [id number] [place] [address] (www.nix-recovery-center. org) - [phone]  [address] [place] [address] (women)  [facility]: Bret Sandoval (pregnant &  treatment) - [phone]  [address] [place] [address] [address]  [facility] - [phone] (must be [facility] BEHAVIORAL HEALTH resident;  services)   [address] [place] [address]  [address] - [phone] (Pregnant and  treatment)   1201 [facility], 2301 [facility],[address]    Outpatient Treatment Services:  Lisandra Mott and [address] [place] [address] for [facility] - [phone]   1440 [facility], 195 [facility]  Ej Mays for [facility] - 103 [facility], [facility]. [address]  [facility]- [phone]  [address] [place], 201 [facility] Alcohol and Drug Treatment Program THAD RENE [facility] location) - [address] [facility], 2301 [facility],[address] (4th floor [place])  [place] [facility]) - [address] [place], [address] for [address] [facility] [address] ([address]) - [phone] [address], [address]  [address] [phone]   [address] [place], [address]  [employer] (www.[employer])  - [phone]  214 [facility] [address] [facility] [address] [facility] [address], 590 [facility] - [phone]  [address] Antony Reyes , [address]  R Joanne [facility] 70  [address] [place], 20201 S [facility]  [address] - [phone]   [address], 1612 [facility] - [phone]   [address], 4300 [facility]  [facility] 26 - [phone]   [facility] 85 [facility], 85 [facility]  [address]   [address], 400 [facility] Substance Abuse - [phone]  [address] [place], [address]  [address], [address]  [address] [facility]    Methadone/ [facility] Suboxone - [phone]   7 [facility], [address]  [address] - [phone]   2520 N [facility]  Gateways - [phone]   [address] [place], [address] (use lower level entrance)  [address] - [phone]   2215 [address] , 20201 S [facility]  [facility] - [phone]   [address] [place] [address] Sherie Simmons, [address] [place] [address]  [employer] (www.[employer])  - [phone]  [address], 590 [facility] - [phone] [address]

## 2022-11-25 NOTE — PROGRESS NOTES
Yes  Patient assessed for rehabilitation services?: Yes  Additional Pertinent Hx: 57-year-old male with past medical history significant for substance use, HTN, osteoarthritis who presented to the hospital with complaint of drug overdose. Family / Caregiver Present: No  Referring Practitioner: Russ Herny MD  Diagnosis: Diaphoresis  Follows Commands: Within Functional Limits  Subjective  Subjective: Pt found supine in bed upon arrival and agreeable to therapy. Social/Functional History  Social/Functional History  Lives With: Significant other  Type of Home: Apartment  Home Layout: One level  Home Access: Stairs to enter with rails  Entrance Stairs - Number of Steps: 3 LARISSA  Entrance Stairs - Rails: Both  Bathroom Shower/Tub: Tub/Shower unit  Bathroom Toilet: Standard  Home Equipment:  (owns no DME)  ADL Assistance: Independent  Homemaking Assistance: Independent  Ambulation Assistance: Independent  Transfer Assistance: Independent  Active : No  Patient's  Info: girlfriend drives  Occupation: Unemployed  Additional Comments: Pt reports no recent falls.   Vision/Hearing  Vision  Vision: Within Functional Limits  Hearing  Hearing: Within functional limits    Cognition   Orientation  Overall Orientation Status: Within Normal Limits     Objective   Heart Rate: 66  Heart Rate Source: Monitor  BP: 131/77  BP Location: Left lower arm  BP Method: Automatic  Patient Position: Semi fowlers  MAP (Calculated): 95  Resp: 16  SpO2: 96 %  O2 Device: None (Room air)              AROM RLE (degrees)  RLE AROM: WFL  AROM LLE (degrees)  LLE AROM : WFL  Strength RLE  Strength RLE: WFL  Strength LLE  Strength LLE: WFL        Balance  Sitting: Intact  Standing: Intact  Gait  Overall Level of Assistance: Supervision (pt ambulated without AD to/from bathroom and around loop in hallway; SBA provided initially and pt progressed to [facility])  Bed mobility  Supine to Sit: Independent  Scooting: Independent  Transfers  Sit to Stand: Supervision  Stand to Sit: Supervision  Ambulation  Surface: Level tile  Device: No Device  Assistance: Supervision  Quality of Gait: safe and steady pete, stride length and Laurie. Overall steady with no LOB or near LOB.   Distance: 400'  Stairs/Curb  Stairs?: Yes  Stairs  # Steps : 4  Rails: Right ascending  Device: No Device  Assistance: Modified independent      Balance  Posture: Good  Sitting - Static: Good  Sitting - Dynamic: Good  Standing - Static: Good  Standing - Dynamic: Good           AM-PAC Score  AM-PAC Inpatient Mobility Raw Score : 21 (11/25/22 1334)  AM-PAC Inpatient T-Scale Score : 50.25 (11/25/22 1334)  Mobility Inpatient CMS 0-100% Score: 28.97 (11/25/22 1334)  Mobility Inpatient CMS G-Code Modifier : CJ (11/25/22 1334)          Education  Patient Education  Education Provided Comments: role of PT, use of call light and d/c planning - pt verb understanding      Therapy Time   Individual Concurrent Group Co-treatment   Time In 1015         Time Out 1041         Minutes 26           Timed Code Treatment Minutes:  11    Total Treatment Minutes:  26    Nagi Solorzano, PT, DPT

## 2022-11-25 NOTE — ED PROVIDER NOTES
Patient was turned over to me by my partner Dr. Allie Reynolds for final disposition CTA head and neck showed no aneurysmal dilatation no intracranial bleeding or mass second set of troponins was in the normal range I got the patient up to walk him and he was profoundly dizzy made the decision to admit overnight for overdose and headache I did discuss the case with neurosurgery who said that they would be happy to consult on the patient patient will be admitted to the hospitalist service at [facility], MD  11/24/22 2054

## 2022-11-25 NOTE — ED NOTES
Pt to [facility].       Orville Su RN  11/24/22 3950

## 2022-11-25 NOTE — PROGRESS NOTES
Pt d/c'd 11/25/2022. IV access removed with no complications. Removed and returned tele box. Reviewed d/c instructions, home meds, and f/u information utilizing teach-back method. Patient verbalized understanding. Patient declined wheelchair assistance to lobby and left with all documented belongings.

## 2022-11-25 NOTE — ED NOTES
Nurse and RT walked pt. Sats stayed 98-99 % on RA. HR stayed high 90's while walking. Pt. Had no signs of Respiratory distress while walking. Pt only stated he was dizzy.  Dr. jane Shepherd, P  11/24/22 2033

## 2022-11-25 NOTE — CARE COORDINATION
11:21 AM  Upon review of pts chart, pt is from home, IPTA, no current home services. Pt denies a need for any. Pt has transport at time of dc. CM will sign off at this time. Please consult CM/SW if any dcp needs arise. Electronically signed by Stephanie Gloria RN on 11/25/2022 at 11:21 AM       9:39 AM  Alan Johnson from . Lesly Cheema assisted patient in signing up for Medicaid.    Electronically signed by Stephanie Gloria RN on 11/25/2022 at 9:42 AM

## 2022-11-25 NOTE — H&P
Internal Medicine H&P    Date: 2022   Patient: Luke Alex   Patient : 1965     CC: Drug Overdose (Pt states he smoked some crack and then felt funny. Per police his wife called [phone]. Pt was agonal breathing at scene and was given \"2 doses of 4 mg\" Narcan on scene. Pt extremely diaphoretic upon arrival. No pain. Alert and oriented.)       Subjective     HPI: This is a 68-year-old male with past medical history significant for substanceuse, HTN, osteoarthritis who presented to the hospital with complaint of drug overdose. The history was obtained from the patient who is from home. As per the patient, he had consumed $10 worth of cocaine and a small packet from his regular place of purchase. He had ingested the cocaine at 3pm yesterday. Right after he had consumed the cocaine he had started feeling headache in the occipital region that was radiating to his neck, was 7/10, constant and felt like pressure. He states that this headache is worse by loud noise and bright light. He also noticed shortness of breath, diaphoresis, fuzzy feeling. He states he feels he may have lost consciousness but is unsure if he had hit his head. His girlfriend had called the EMS and the police as he was starting to have agonal breaths and complained of feeling numb all over. He also endorses feeling fatigued and generalized weakness all over his body. He says he feels that the cocaine might have been infiltrating fentanyl. He otherwise denies experiencing any nausea, vomiting, chest pain, abdominal pain, changes in bowel pattern, changes in urination or any sick contacts. He also denies his girlfriend mentioning him experiencing any seizure-like activity, tongue biting, urinary incontinence. Patient was given 2 doses of 4 mg Narcan on the scene.   Patient is a chronic smoker has been smoking for many years says he has not counted and smokes about 1 pack/day, occasionally drinks alcohol and states he only consumes cocaine every now and then does help him calm down. In the ED at [place], the patient appeared diaphoretic and endorse fatigue. On physical examination he was in acute distress, ill-appearing and had notable diaphoresis. His vitals were significant for hypothermia for which she was given a bear hugger. Labs were significant for glucose 150, troponin, acetaminophen level, salicylate level were within normal limits. CT head and CTA were negative chest x-ray was significant for bibasilar atelectasis. EKG x2 were WNL. Patient was given Reglan, Benadryl normal saline bolus and Narcan in the ED. PMHx:  History reviewed. No pertinent past medical history. PSurgHx:      Procedure Laterality Date    APPENDECTOMY          Medication:  No medications prior to admission. Allergies:  Patient has no known allergies. SocHx:  Social History     Socioeconomic History    Marital status: Single     Spouse name: None    Number of children: None    Years of education: None    Highest education level: None   Tobacco Use    Smoking status: Every Day     Packs/day: 1.00     Types: Cigarettes    Smokeless tobacco: Never   Substance and Sexual Activity    Alcohol use: Yes     Comment: rare    Drug use: Yes     Types: Cocaine        FHx:  History reviewed. No pertinent family history. ROS:  Review of Systems   Constitutional:  Positive for fatigue. Negative for activity change and appetite change. HENT:  Negative for facial swelling. Respiratory:  Positive for shortness of breath. Negative for cough. Cardiovascular:  Negative for chest pain. Gastrointestinal:  Negative for abdominal distention, abdominal pain, diarrhea, nausea and vomiting. Endocrine: Negative for polyphagia and polyuria. Genitourinary:  Positive for frequency. Negative for hematuria and urgency. Musculoskeletal:  Positive for neck pain. Negative for back pain. Skin:  Positive for pallor.    Neurological:  Positive for numbness and headaches. Negative for tremors, syncope and weakness. Hematological:  Negative for adenopathy. Does not bruise/bleed easily. Psychiatric/Behavioral:  Negative for agitation, confusion, decreased concentration, sleep disturbance and suicidal ideas. Objective     Vital Signs:  Patient Vitals for the past 8 hrs:   BP Temp Temp src Pulse Resp SpO2 Height Weight   11/24/22 2217 -- -- -- -- -- -- 6' 1\" (1.854 m) 180 lb (81.6 kg)   11/24/22 2214 124/76 97.4 °F (36.3 °C) Oral 71 16 95 % -- --   11/24/22 2104 109/73 -- -- 93 19 94 % -- --   11/24/22 2031 -- 98.5 °F (36.9 °C) Oral -- -- -- -- --   11/24/22 1942 -- 97.4 °F (36.3 °C) Tympanic -- -- -- -- --   11/24/22 1913 120/82 -- -- 82 11 98 % -- --   11/24/22 1758 -- (!) 96.7 °F (35.9 °C) Temporal -- -- -- -- --   11/24/22 1740 -- -- -- 82 19 96 % -- --   11/24/22 1731 -- -- -- 82 20 (!) 87 % -- --   11/24/22 1730 -- (!) 94.9 °F (34.9 °C) Temporal -- -- -- -- --   11/24/22 1719 131/79 -- Oral 91 17 95 % -- --         Physical Exam  Constitutional:       Appearance: He is not ill-appearing. HENT:      Head: Normocephalic and atraumatic. Comments: Neck rigidity     Nose: Nose normal.      Mouth/Throat:      Mouth: Mucous membranes are moist.   Cardiovascular:      Rate and Rhythm: Normal rate and regular rhythm. Pulses: Normal pulses. Heart sounds: Normal heart sounds. Pulmonary:      Effort: Pulmonary effort is normal. No respiratory distress. Breath sounds: Normal breath sounds. No stridor. No wheezing, rhonchi or rales. Chest:      Chest wall: No tenderness. Abdominal:      General: Bowel sounds are normal. There is no distension. Palpations: Abdomen is soft. There is no mass. Tenderness: There is no abdominal tenderness. There is no rebound. Hernia: No hernia is present. Musculoskeletal:      Cervical back: Normal range of motion. Right lower leg: No edema. Left lower leg: No edema.    Skin: Capillary Refill: Capillary refill takes less than 2 seconds. Coloration: Skin is not jaundiced or pale. Findings: No bruising, erythema, lesion or rash. Neurological:      General: No focal deficit present. Mental Status: He is alert. Mental status is at baseline. Deep Tendon Reflexes: Reflexes normal.      Comments: 3/5 motor strength bilaterally lower extremity, 4/5 motor extremities bilaterally in UE,  Light touch sensation decreased in bilateral lower extremity worse on left than right     Psychiatric:         Mood and Affect: Mood normal.          Labs:  CBC:   Recent Labs     11/24/22  1729   WBC 6.5   HGB 15.2   HCT 44.6          BMP:   Recent Labs     11/24/22  1729      K 3.9      CO2 23   BUN 20   CREATININE 1.0   GLUCOSE 150*     Magnesium:   Recent Labs     11/24/22 1729   MG 2.40     LFT's:   Recent Labs     11/24/22 1729   AST 32   ALT 32   BILITOT <0.2   ALKPHOS 59       Troponin:   Recent Labs     11/24/22 1729 11/24/22  [address] [place] [address] <0.01 <0.01     Lactic acid: No results for input(s): LACTATE in the last 72 hours. BNP: No results for input(s): BNP in the last 72 hours. Pro-BNP: No results for input(s): PROBNP in the last 72 hours. Procalcitonin: No results for input(s): PROCAL in the last 72 hours. CRP: No results for input(s): CRP in the last 72 hours. ESR: No results for input(s): ESR in the last 72 hours. ABGs: No results for input(s): PHART, OWD2KMG, PO2ART in the last 72 hours. VBGs: No results for input(s): PHVEN, DAB5ZKS, PO2VEN in the last 72 hours. INR: No results for input(s): INR in the last 72 hours. COVID-19: No results for input(s): COVID19 in the last 72 hours. U/A: No results for input(s): NITRITE, COLORU, PHUR, LABCAST, WBCUA, RBCUA, MUCUS, TRICHOMONAS, YEAST, BACTERIA, CLARITYU, SPECGRAV, LEUKOCYTESUR, UROBILINOGEN, BILIRUBINUR, BLOODU, GLUCOSEU, KETONES, AMORPHOUS in the last 72 hours.     Radiology:  CTA HEAD NECK W CONTRAST   Final Result      1. No aneurysms, vascular occlusions, or intracranial stenoses identified. 2.  No significant stenosis in the extracranial vertebral or carotid arteries. XR CHEST PORTABLE   Final Result      Mild bibasilar atelectasis. CT HEAD WO CONTRAST   Final Result      1. No acute intracranial process. Assessment & Plan   Drug overdose Cocaine and Fentanyl  Patient states consuming cocaine and fentanyl prior to arrival.    Patient was hypothermic in the other ED. Received Narcan  -Ordered UDS positive for cocaine and fentanyl  -[id number]    Headache 2/2 migraine  Meningitis versus subarachnoid hemorrhage  Patient has no history of headaches, neck rigidity, Currently having 7/10 headache radiating to the neck,photosensitivity. Patient states neck rigidity, denies any sick contact, fever, chills.   -Monitoring blood pressure for now  -We will avoid beta-blocker  -We will consult neurology/neurosurgery  -We will consider MRI and LP  -Giving pain killers as needed    Chronic medical problems  HTN  No records of home medications available  -Blood pressure control for now    BPH  -At home on Flomax    Osteoarthritis  -Patient takes Valtrex    DVT PPx:None  Diet: No diet orders on file   Code status:  No Order     Will discuss with attending physician MD Hawk Martines MD,   Internal Medicine, PGY-1

## 2022-11-25 NOTE — PLAN OF CARE
Problem: Safety - Adult  Goal: Free from fall injury  Outcome: Progressing  Note: All fall precautions in place and call light is within reach. Problem: Pain  Goal: Verbalizes/displays adequate comfort level or baseline comfort level  Outcome: Progressing  Note: Pt. Managing pain per MAR.

## 2022-11-25 NOTE — PROGRESS NOTES
4 Eyes Admission Assessment     I agree as the admission nurse that 2 RN's have performed a thorough Head to Toe Skin Assessment on the patient. ALL assessment sites listed below have been assessed on admission. Areas assessed by both nurses:   [x]   Head, Face, and Ears   [x]   Shoulders, Back, and Chest  [x]   Arms, Elbows, and Hands   [x]   Coccyx, Sacrum, and Ischium  [x]   Legs, Feet, and Heels        Does the Patient have Skin Breakdown?   No         Lenin Prevention initiated:  No   Wound Care Orders initiated:  No      [facility] nurse consulted for Pressure Injury (Stage 3,4, Unstageable, DTI, NWPT, and Complex wounds) or Lenin score 18 or lower:  No      Nurse 1 eSignature: Electronically signed by Brynn Quiroz RN on 11/24/22 at 11:52 PM EST    **SHARE this note so that the co-signing nurse is able to place an eSignature**    Nurse 2 eSignature: Electronically signed by Kianna George RN on 11/25/22 at 12:26 AM EST

## 2022-11-25 NOTE — PROGRESS NOTES
Progress Note    Admit Date: 11/24/2022  Day: 2  Diet: ADULT DIET; Regular    CC:  Headache    Interval history:   No acute events overnight. Pt reports resting well. He denies any headache, fever, chills, dizziness, lightheadedness, neck pain, neck stiffness, chest pain, dyspnea, n/v at this time. Medications:     Scheduled Meds:   tamsulosin  0.4 mg Oral Daily    sodium chloride flush  5-40 mL IntraVENous 2 times per day     Continuous Infusions:   sodium chloride       PRN Meds:sodium chloride flush, sodium chloride, ondansetron **OR** ondansetron, polyethylene glycol, acetaminophen **OR** acetaminophen    Objective:   Vitals:   T-max:  Patient Vitals for the past 8 hrs:   BP Temp Temp src Pulse Resp SpO2 Weight   11/25/22 0851 -- -- -- 73 -- 96 % --   11/25/22 0756 126/75 98.6 °F (37 °C) Oral 96 16 97 % --   11/25/22 0649 123/83 97.9 °F (36.6 °C) Oral 96 16 97 % --   11/25/22 0600 -- -- -- -- -- -- 187 lb 4 oz (84.9 kg)       Intake/Output Summary (Last 24 hours) at 11/25/2022 1049  Last data filed at 11/25/2022 0931  Gross per 24 hour   Intake 0 ml   Output 700 ml   Net -700 ml       Physical Exam  Vitals and nursing note reviewed. Constitutional:       General: He is not in acute distress. Appearance: Normal appearance. He is normal weight. He is not ill-appearing or diaphoretic. HENT:      Head: Normocephalic and atraumatic. Mouth/Throat:      Mouth: Mucous membranes are moist.      Pharynx: Oropharynx is clear. Cardiovascular:      Rate and Rhythm: Normal rate and regular rhythm. Pulses: Normal pulses. Heart sounds: Normal heart sounds. No murmur heard. No friction rub. No gallop. Pulmonary:      Effort: Pulmonary effort is normal. No respiratory distress. Breath sounds: Normal breath sounds. No wheezing, rhonchi or rales. Abdominal:      General: There is no distension. Palpations: Abdomen is soft. Tenderness: There is no abdominal tenderness.  There is no guarding. Hernia: No hernia is present. Musculoskeletal:      Right lower leg: No edema. Left lower leg: No edema. Skin:     General: Skin is warm and dry. Coloration: Skin is not pale. Neurological:      Mental Status: He is alert and oriented to person, place, and time. Mental status is at baseline. Cranial Nerves: No cranial nerve deficit. Sensory: No sensory deficit. Motor: No weakness. LABS:    CBC:   Recent Labs     11/24/22 1729 11/25/22  0548   WBC 6.5 9.6   HGB 15.2 14.0   HCT 44.6 41.6    322   MCV 86.0 88.4     Renal:    Recent Labs     11/24/22 1729 11/25/22  0548    140   K 3.9 4.4    104   CO2 23 28   BUN 20 18   CREATININE 1.0 1.0   GLUCOSE 150* 85   CALCIUM 9.4 8.8   MG 2.40  --    ANIONGAP 15 8     Hepatic:   Recent Labs     11/24/22 1729   AST 32   ALT 32   BILITOT <0.2   PROT 7.8   LABALBU 4.4   ALKPHOS 59     Troponin:   Recent Labs     11/24/22 1729 11/24/22 1929   TROPONINI <0.01 <0.01     BNP: No results for input(s): BNP in the last 72 hours. Lipids: No results for input(s): CHOL, HDL in the last 72 hours. Invalid input(s): LDLCALCU, TRIGLYCERIDE  ABGs:  No results for input(s): PHART, AOD9FXZ, PO2ART, VWI6NMI, BEART, THGBART, R1CMIRCP, FGR2YBV in the last 72 hours. INR: No results for input(s): INR in the last 72 hours. Lactate: No results for input(s): LACTATE in the last 72 hours. Cultures:  -----------------------------------------------------------------  RAD:   CTA HEAD NECK W CONTRAST   Final Result      1. No aneurysms, vascular occlusions, or intracranial stenoses identified. 2.  No significant stenosis in the extracranial vertebral or carotid arteries. XR CHEST PORTABLE   Final Result      Mild bibasilar atelectasis. CT HEAD WO CONTRAST   Final Result      1. No acute intracranial process.             Assessment/Plan:   Drug overdose Cocaine and Fentanyl  Patient states consuming cocaine and fentanyl prior to arrival.    Patient was hypothermic in the other ED. Received Narcan  -Ordered UDS positive for cocaine and fentanyl  [id number]     Headache 2/2 migraine  Meningitis versus subarachnoid hemorrhage  Patient has no history of headaches, neck rigidity, Currently having 7/10 headache radiating to the neck,photosensitivity. Patient states neck rigidity, denies any sick contact, fever, chills.   - Monitoring blood pressure for now  - We will avoid beta-blocker  - Nsgy cx, apprec recs  - Giving pain killers as needed  - Sx resolved  - Pt declined LP, will defer iso improved clinical exam     Chronic medical problems  HTN  No records of home medications available  -Blood pressure controlled     BPH  -At home on Flomax     Osteoarthritis  -Patient takes Valtrex    Code Status: Full  FEN: Regular   DISPO: [address]  Internal Medicine Resident  PGY-1    This patient has been staffed and discussed with Ludwig Bull MD.

## 2022-11-28 LAB
EKG ATRIAL RATE: 95 BPM
EKG DIAGNOSIS: NORMAL
EKG P AXIS: 60 DEGREES
EKG P-R INTERVAL: 168 MS
EKG Q-T INTERVAL: 382 MS
EKG QRS DURATION: 80 MS
EKG QTC CALCULATION (BAZETT): 480 MS
EKG R AXIS: 2 DEGREES
EKG T AXIS: 63 DEGREES
EKG VENTRICULAR RATE: 95 BPM

## 2022-12-05 NOTE — DISCHARGE SUMMARY
INTERNAL MEDICINE DEPARTMENT AT [address] [place] [address]  DISCHARGE SUMMARY    Patient ID: Tai Frazier                                             Discharge Date: 12/5/2022   Patient's PCP: No primary care provider on file. Discharge Physician: Marialuisa Chao MD MD  Admit Date: 11/24/2022   Admitting Physician: Britta Prabhakar MD    PROBLEMS DURING HOSPITALIZATION:  Present on Admission:   Drug overdose, accidental or unintentional, initial encounter      DISCHARGE DIAGNOSES:  Cocaine abuse  Fentanyl abuse    HPI:  22-year-old male with past medical history significant for substanceuse, HTN, osteoarthritis who presented to the hospital with complaint of drug overdose. The history was obtained from the patient who is from home. As per the patient, he had consumed $10 worth of cocaine and a small packet from his regular place of purchase. He had ingested the cocaine at 3pm yesterday. Right after he had consumed the cocaine he had started feeling headache in the occipital region that was radiating to his neck, was 7/10, constant and felt like pressure. He states that this headache is worse by loud noise and bright light. He also noticed shortness of breath, diaphoresis, fuzzy feeling. He states he feels he may have lost consciousness but is unsure if he had hit his head. His girlfriend had called the EMS and the police as he was starting to have agonal breaths and complained of feeling numb all over. He also endorses feeling fatigued and generalized weakness all over his body. He says he feels that the cocaine might have been infiltrating fentanyl. He otherwise denies experiencing any nausea, vomiting, chest pain, abdominal pain, changes in bowel pattern, changes in urination or any sick contacts. He also denies his girlfriend mentioning him experiencing any seizure-like activity, tongue biting, urinary incontinence.   Patient was given 2 doses of 4 mg Narcan on the scene. Patient is a chronic smoker has been smoking for many years says he has not counted and smokes about 1 pack/day, occasionally drinks alcohol and states he only consumes cocaine every now and then does help him calm down. In the ED at [facility], the patient appeared diaphoretic and endorse fatigue. On physical examination he was in acute distress, ill-appearing and had notable diaphoresis. His vitals were significant for hypothermia for which she was given a bear hugger. Labs were significant for glucose 150, troponin, acetaminophen level, salicylate level were within normal limits. CT head and CTA were negative chest x-ray was significant for bibasilar atelectasis. EKG x2 were WNL. Patient was given Reglan, Benadryl normal saline bolus and Narcan in the ED. The day following admission, patient's symptoms have fully resolved. He no longer endorsed any headache, neck rigidity, photosensitivity, or other complaints. He declined lumbar puncture and was discharged home for close follow-up with his primary physician. Physical Exam:  Physical Exam  Vitals and nursing note reviewed. Constitutional:       General: He is not in acute distress. Appearance: Normal appearance. He is normal weight. He is not ill-appearing. HENT:      Mouth/Throat:      Mouth: Mucous membranes are moist.      Pharynx: Oropharynx is clear. Cardiovascular:      Rate and Rhythm: Normal rate and regular rhythm. Pulses: Normal pulses. Heart sounds: Normal heart sounds. No murmur heard. No friction rub. No gallop. Pulmonary:      Effort: Pulmonary effort is normal. No respiratory distress. Breath sounds: Normal breath sounds. No wheezing, rhonchi or rales. Musculoskeletal:      Cervical back: Normal range of motion and neck supple. No rigidity or tenderness. Skin:     General: Skin is warm and dry. Coloration: Skin is not pale.    Neurological:      General: No focal deficit present. Mental Status: He is alert and oriented to person, place, and time. Cranial Nerves: No cranial nerve deficit. Sensory: No sensory deficit. Motor: No weakness.       Gait: Gait normal.        Consults: neurology  Significant Diagnostic Studies:  chest x-ray  Treatments: IV hydration  Disposition: home  Discharged Condition: Stable  Follow Up: Primary Care Physician in one week    DISCHARGE MEDICATION:       Medication List        START taking these medications      tamsulosin 0.4 MG capsule  Commonly known as: FLOMAX  Take 1 capsule by mouth daily               Where to Get Your Medications        These medications were sent to [address] [place] [address]  [address]      Phone: [phone]   tamsulosin 0.4 MG capsule          Activity: activity as tolerated  Diet: regular diet  Wound Care: none needed    Time Spent on discharge is more than 20 minutes    Signed:  Gisele Arellano  Internal Medicine Resident  PGY-1

## 2025-02-28 ENCOUNTER — HOSPITAL ENCOUNTER (EMERGENCY)
Age: 60
Discharge: HOME | End: 2025-02-28
Attending: INTERNAL MEDICINE
Payer: MEDICAID

## 2025-02-28 VITALS
BODY MASS INDEX: 25.08 KG/M2 | SYSTOLIC BLOOD PRESSURE: 131 MMHG | TEMPERATURE: 97.6 F | WEIGHT: 189.2 LBS | RESPIRATION RATE: 16 BRPM | OXYGEN SATURATION: 99 % | HEIGHT: 73 IN | HEART RATE: 116 BPM | DIASTOLIC BLOOD PRESSURE: 91 MMHG

## 2025-02-28 DIAGNOSIS — Z20.2 STD EXPOSURE: Primary | ICD-10-CM

## 2025-02-28 LAB
BILIRUB UR QL STRIP.AUTO: NEGATIVE
CLARITY UR: CLEAR
COLOR UR: YELLOW
GLUCOSE UR STRIP.AUTO-MCNC: NEGATIVE MG/DL
HGB UR QL STRIP.AUTO: NEGATIVE
KETONES UR STRIP.AUTO-MCNC: NEGATIVE MG/DL
LEUKOCYTE ESTERASE UR QL STRIP.AUTO: NEGATIVE
NITRITE UR QL STRIP.AUTO: NEGATIVE
PH UR STRIP.AUTO: 5.5 [PH] (ref 5–8)
PROT UR STRIP.AUTO-MCNC: NEGATIVE MG/DL
SP GR UR STRIP.AUTO: 1.02 (ref 1–1.03)
TRICHOMONAS #/AREA URNS HPF: NORMAL /[HPF]
UA COMPLETE W REFLEX CULTURE PNL UR: NORMAL
UA DIPSTICK W REFLEX MICRO PNL UR: NORMAL
URN SPEC COLLECT METH UR: NORMAL
UROBILINOGEN UR STRIP-ACNC: 0.2 E.U./DL

## 2025-02-28 PROCEDURE — 81001 URINALYSIS AUTO W/SCOPE: CPT

## 2025-02-28 PROCEDURE — 87591 N.GONORRHOEAE DNA AMP PROB: CPT

## 2025-02-28 PROCEDURE — 87491 CHLMYD TRACH DNA AMP PROBE: CPT

## 2025-02-28 PROCEDURE — 99283 EMERGENCY DEPT VISIT LOW MDM: CPT

## 2025-02-28 NOTE — ED PROVIDER NOTES
EMERGENCY MEDICINE PROVIDER NOTE    Patient Identification  Pt Name: Dinesh Izquierdo  MRN: [id number]  Birthdate 1965  Date of evaluation: 2/28/2025  Provider: ANIRUDH VARELA DO  PCP: No primary care provider on file.    Chief Complaint  Exposure to STD      HPI  (History provided by patient)  This is a 59 y.o. male who was brought in by self for STD screen.  Patient states that his girlfriend was positive for trichomonas and he wanted to be tested.  He has no symptoms.  Patient denies abdominal pain.  He denies hematuria and dysuria.  Patient denies any rash.    I have reviewed the following nursing documentation:  Allergies: Patient has no known allergies.    Past medical history: History reviewed. No pertinent past medical history.  Past surgical history:   Past Surgical History:   Procedure Laterality Date    APPENDECTOMY         Home medications:   Discharge Medication List as of 2/28/2025  5:34 PM        CONTINUE these medications which have NOT CHANGED    Details   tamsulosin (FLOMAX) 0.4 MG capsule Take 1 capsule by mouth daily, Disp-30 capsule, R-3Normal             Social history:  reports that he has been smoking cigarettes. He has never used smokeless tobacco. He reports current alcohol use. He reports current drug use. Drug: Cocaine.    Family history:  History reviewed. No pertinent family history.    Exam  ED Triage Vitals [02/28/25 1706]   BP Systolic BP Percentile Diastolic BP Percentile Temp Temp Source Pulse Respirations SpO2   (!) 131/91 -- -- 97.6 °F (36.4 °C) Oral (!) 116 16 99 %      Height Weight - Scale         1.854 m (6' 1\") 85.8 kg (189 lb 3.2 oz)           Nursing note and vitals reviewed.  General: Patient does not appear to be in acute distress  Head: Atraumatic. \  ENT: No evidence of angioedema.   Eyes: Anicteric sclera. No discharge.   Neck: Supple. Trachea midline.   Cardiovascular: RRR; heart has a regular rhythm and rate  Pulmonary/Chest: Effort normal. No respiratory distress.

## 2025-02-28 NOTE — ED TRIAGE NOTES
60y/o male presents to the ED with possible exposure to trich. Pt states his girlfriend stated she had trich, however, he does not have any symptoms or complaints. Pt requesting to be tested for std

## 2025-03-04 LAB
C TRACH DNA UR QL NAA+PROBE: NEGATIVE
N GONORRHOEA DNA UR QL NAA+PROBE: NEGATIVE